# Patient Record
Sex: MALE | Race: WHITE | Employment: OTHER | URBAN - METROPOLITAN AREA
[De-identification: names, ages, dates, MRNs, and addresses within clinical notes are randomized per-mention and may not be internally consistent; named-entity substitution may affect disease eponyms.]

---

## 2017-07-15 ENCOUNTER — HOSPITAL ENCOUNTER (INPATIENT)
Facility: HOSPITAL | Age: 57
LOS: 3 days | Discharge: NURSING, REHAB OR LONG-TERM CARE | DRG: 552 | End: 2017-07-19
Attending: SURGERY | Admitting: SURGERY
Payer: COMMERCIAL

## 2017-07-15 DIAGNOSIS — R26.2 AMBULATORY DYSFUNCTION: ICD-10-CM

## 2017-07-15 DIAGNOSIS — S12.600A CLOSED C7 FRACTURE (HCC): Primary | ICD-10-CM

## 2017-07-15 PROCEDURE — 71010 HB CHEST X-RAY 1 VIEW FRONTAL: CPT

## 2017-07-15 PROCEDURE — 96375 TX/PRO/DX INJ NEW DRUG ADDON: CPT

## 2017-07-15 PROCEDURE — 96374 THER/PROPH/DIAG INJ IV PUSH: CPT

## 2017-07-15 RX ORDER — FENTANYL CITRATE 50 UG/ML
INJECTION, SOLUTION INTRAMUSCULAR; INTRAVENOUS CODE/TRAUMA/SEDATION MEDICATION
Status: COMPLETED | OUTPATIENT
Start: 2017-07-15 | End: 2017-07-15

## 2017-07-15 RX ORDER — LORAZEPAM 2 MG/ML
INJECTION INTRAMUSCULAR CODE/TRAUMA/SEDATION MEDICATION
Status: COMPLETED | OUTPATIENT
Start: 2017-07-15 | End: 2017-07-15

## 2017-07-15 RX ADMIN — FENTANYL CITRATE 100 MCG: 50 INJECTION, SOLUTION INTRAMUSCULAR; INTRAVENOUS at 23:53

## 2017-07-15 RX ADMIN — LORAZEPAM 1 MG: 2 INJECTION INTRAMUSCULAR; INTRAVENOUS at 23:59

## 2017-07-16 ENCOUNTER — APPOINTMENT (EMERGENCY)
Dept: RADIOLOGY | Facility: HOSPITAL | Age: 57
DRG: 552 | End: 2017-07-16
Payer: COMMERCIAL

## 2017-07-16 LAB
ABO GROUP BLD: NORMAL
ANION GAP SERPL CALCULATED.3IONS-SCNC: 10 MMOL/L (ref 4–13)
BLD GP AB SCN SERPL QL: NEGATIVE
BUN SERPL-MCNC: 8 MG/DL (ref 5–25)
CALCIUM SERPL-MCNC: 8 MG/DL (ref 8.3–10.1)
CHLORIDE SERPL-SCNC: 98 MMOL/L (ref 100–108)
CO2 SERPL-SCNC: 22 MMOL/L (ref 21–32)
CREAT SERPL-MCNC: 0.54 MG/DL (ref 0.6–1.3)
GFR SERPL CREATININE-BSD FRML MDRD: >60 ML/MIN/1.73SQ M
GLUCOSE SERPL-MCNC: 96 MG/DL (ref 65–140)
HCT VFR BLD AUTO: 35.4 % (ref 36.5–49.3)
HCT VFR BLD AUTO: 37.4 % (ref 36.5–49.3)
HGB BLD-MCNC: 12.9 G/DL (ref 12–17)
HGB BLD-MCNC: 13.6 G/DL (ref 12–17)
POTASSIUM SERPL-SCNC: 3.6 MMOL/L (ref 3.5–5.3)
RH BLD: NEGATIVE
SODIUM SERPL-SCNC: 130 MMOL/L (ref 136–145)
SPECIMEN EXPIRATION DATE: NORMAL

## 2017-07-16 PROCEDURE — 86900 BLOOD TYPING SEROLOGIC ABO: CPT | Performed by: EMERGENCY MEDICINE

## 2017-07-16 PROCEDURE — 85014 HEMATOCRIT: CPT | Performed by: EMERGENCY MEDICINE

## 2017-07-16 PROCEDURE — 72125 CT NECK SPINE W/O DYE: CPT

## 2017-07-16 PROCEDURE — 86901 BLOOD TYPING SEROLOGIC RH(D): CPT | Performed by: EMERGENCY MEDICINE

## 2017-07-16 PROCEDURE — 70450 CT HEAD/BRAIN W/O DYE: CPT

## 2017-07-16 PROCEDURE — 86850 RBC ANTIBODY SCREEN: CPT | Performed by: EMERGENCY MEDICINE

## 2017-07-16 PROCEDURE — 85018 HEMOGLOBIN: CPT | Performed by: EMERGENCY MEDICINE

## 2017-07-16 PROCEDURE — 2W32XYZ IMMOBILIZATION OF NECK USING OTHER DEVICE: ICD-10-PCS | Performed by: EMERGENCY MEDICINE

## 2017-07-16 PROCEDURE — 74177 CT ABD & PELVIS W/CONTRAST: CPT

## 2017-07-16 PROCEDURE — 36415 COLL VENOUS BLD VENIPUNCTURE: CPT | Performed by: EMERGENCY MEDICINE

## 2017-07-16 PROCEDURE — 80048 BASIC METABOLIC PNL TOTAL CA: CPT | Performed by: EMERGENCY MEDICINE

## 2017-07-16 PROCEDURE — 71260 CT THORAX DX C+: CPT

## 2017-07-16 PROCEDURE — 99285 EMERGENCY DEPT VISIT HI MDM: CPT

## 2017-07-16 RX ORDER — METHOCARBAMOL 500 MG/1
500 TABLET, FILM COATED ORAL 4 TIMES DAILY PRN
COMMUNITY
End: 2019-12-12 | Stop reason: SDUPTHER

## 2017-07-16 RX ORDER — ACETAMINOPHEN 325 MG/1
975 TABLET ORAL EVERY 8 HOURS SCHEDULED
Status: DISCONTINUED | OUTPATIENT
Start: 2017-07-16 | End: 2017-07-19 | Stop reason: HOSPADM

## 2017-07-16 RX ORDER — METHOCARBAMOL 500 MG/1
500 TABLET, FILM COATED ORAL EVERY 8 HOURS SCHEDULED
Status: DISCONTINUED | OUTPATIENT
Start: 2017-07-16 | End: 2017-07-19 | Stop reason: HOSPADM

## 2017-07-16 RX ORDER — MORPHINE SULFATE 4 MG/ML
4 INJECTION, SOLUTION INTRAMUSCULAR; INTRAVENOUS ONCE
Status: COMPLETED | OUTPATIENT
Start: 2017-07-16 | End: 2017-07-16

## 2017-07-16 RX ORDER — PANTOPRAZOLE SODIUM 20 MG/1
20 TABLET, DELAYED RELEASE ORAL
Status: DISCONTINUED | OUTPATIENT
Start: 2017-07-17 | End: 2017-07-19 | Stop reason: HOSPADM

## 2017-07-16 RX ORDER — LOSARTAN POTASSIUM 50 MG/1
100 TABLET ORAL DAILY
Status: DISCONTINUED | OUTPATIENT
Start: 2017-07-16 | End: 2017-07-19 | Stop reason: HOSPADM

## 2017-07-16 RX ORDER — METOPROLOL TARTRATE 50 MG/1
50 TABLET, FILM COATED ORAL EVERY 12 HOURS SCHEDULED
COMMUNITY
End: 2020-09-14

## 2017-07-16 RX ORDER — LOSARTAN POTASSIUM 100 MG/1
100 TABLET ORAL DAILY
COMMUNITY
End: 2020-03-09

## 2017-07-16 RX ORDER — LOSARTAN POTASSIUM 25 MG/1
100 TABLET ORAL DAILY
COMMUNITY
End: 2017-07-16

## 2017-07-16 RX ORDER — METOPROLOL TARTRATE 50 MG/1
50 TABLET, FILM COATED ORAL EVERY 12 HOURS SCHEDULED
Status: DISCONTINUED | OUTPATIENT
Start: 2017-07-16 | End: 2017-07-19 | Stop reason: HOSPADM

## 2017-07-16 RX ORDER — OXYCODONE HYDROCHLORIDE 10 MG/1
10 TABLET ORAL EVERY 4 HOURS PRN
Status: DISCONTINUED | OUTPATIENT
Start: 2017-07-16 | End: 2017-07-19 | Stop reason: HOSPADM

## 2017-07-16 RX ADMIN — METHOCARBAMOL 500 MG: 500 TABLET ORAL at 22:26

## 2017-07-16 RX ADMIN — METOPROLOL TARTRATE 50 MG: 50 TABLET ORAL at 22:26

## 2017-07-16 RX ADMIN — ACETAMINOPHEN 975 MG: 325 TABLET, FILM COATED ORAL at 13:10

## 2017-07-16 RX ADMIN — ACETAMINOPHEN 975 MG: 325 TABLET, FILM COATED ORAL at 22:25

## 2017-07-16 RX ADMIN — HYDROMORPHONE HYDROCHLORIDE 1 MG: 1 INJECTION, SOLUTION INTRAMUSCULAR; INTRAVENOUS; SUBCUTANEOUS at 17:07

## 2017-07-16 RX ADMIN — MORPHINE SULFATE 4 MG: 4 INJECTION, SOLUTION INTRAMUSCULAR; INTRAVENOUS at 01:26

## 2017-07-16 RX ADMIN — ACETAMINOPHEN 975 MG: 325 TABLET, FILM COATED ORAL at 06:32

## 2017-07-16 RX ADMIN — OXYCODONE HYDROCHLORIDE 10 MG: 10 TABLET ORAL at 11:39

## 2017-07-16 RX ADMIN — IOHEXOL 100 ML: 350 INJECTION, SOLUTION INTRAVENOUS at 00:09

## 2017-07-16 RX ADMIN — METHOCARBAMOL 500 MG: 500 TABLET ORAL at 06:32

## 2017-07-16 RX ADMIN — METOPROLOL TARTRATE 50 MG: 50 TABLET ORAL at 14:14

## 2017-07-16 RX ADMIN — METHOCARBAMOL 500 MG: 500 TABLET ORAL at 13:10

## 2017-07-16 RX ADMIN — HYDROMORPHONE HYDROCHLORIDE 1 MG: 1 INJECTION, SOLUTION INTRAMUSCULAR; INTRAVENOUS; SUBCUTANEOUS at 06:42

## 2017-07-17 ENCOUNTER — APPOINTMENT (INPATIENT)
Dept: RADIOLOGY | Facility: HOSPITAL | Age: 57
DRG: 552 | End: 2017-07-17
Payer: COMMERCIAL
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
## 2017-07-17 PROBLEM — S32.049A L4 VERTEBRAL FRACTURE (HCC): Status: ACTIVE | Noted: 2017-07-17

## 2017-07-17 PROBLEM — S12.600A C7 CERVICAL FRACTURE (HCC): Status: ACTIVE | Noted: 2017-07-17

## 2017-07-17 PROBLEM — S30.1XXA HEMATOMA OF LEFT FLANK: Status: ACTIVE | Noted: 2017-07-17

## 2017-07-17 PROCEDURE — G8978 MOBILITY CURRENT STATUS: HCPCS

## 2017-07-17 PROCEDURE — 72040 X-RAY EXAM NECK SPINE 2-3 VW: CPT

## 2017-07-17 PROCEDURE — 97163 PT EVAL HIGH COMPLEX 45 MIN: CPT

## 2017-07-17 PROCEDURE — 97167 OT EVAL HIGH COMPLEX 60 MIN: CPT

## 2017-07-17 PROCEDURE — G8987 SELF CARE CURRENT STATUS: HCPCS

## 2017-07-17 PROCEDURE — G8979 MOBILITY GOAL STATUS: HCPCS

## 2017-07-17 PROCEDURE — G8988 SELF CARE GOAL STATUS: HCPCS

## 2017-07-17 RX ORDER — DOCUSATE SODIUM 100 MG/1
100 CAPSULE, LIQUID FILLED ORAL 2 TIMES DAILY
Status: DISCONTINUED | OUTPATIENT
Start: 2017-07-17 | End: 2017-07-19 | Stop reason: HOSPADM

## 2017-07-17 RX ORDER — POLYETHYLENE GLYCOL 3350 17 G/17G
17 POWDER, FOR SOLUTION ORAL DAILY PRN
Status: DISCONTINUED | OUTPATIENT
Start: 2017-07-17 | End: 2017-07-19 | Stop reason: HOSPADM

## 2017-07-17 RX ORDER — OXYCODONE HYDROCHLORIDE 5 MG/1
5 TABLET ORAL EVERY 4 HOURS PRN
Status: DISCONTINUED | OUTPATIENT
Start: 2017-07-17 | End: 2017-07-19 | Stop reason: HOSPADM

## 2017-07-17 RX ORDER — SENNOSIDES 8.6 MG
1 TABLET ORAL
Status: DISCONTINUED | OUTPATIENT
Start: 2017-07-17 | End: 2017-07-19 | Stop reason: HOSPADM

## 2017-07-17 RX ORDER — GABAPENTIN 100 MG/1
100 CAPSULE ORAL 3 TIMES DAILY
Status: DISCONTINUED | OUTPATIENT
Start: 2017-07-17 | End: 2017-07-19 | Stop reason: HOSPADM

## 2017-07-17 RX ADMIN — OXYCODONE HYDROCHLORIDE 10 MG: 10 TABLET ORAL at 09:51

## 2017-07-17 RX ADMIN — SENNOSIDES 8.6 MG: 8.6 TABLET, FILM COATED ORAL at 21:15

## 2017-07-17 RX ADMIN — LOSARTAN POTASSIUM 100 MG: 50 TABLET, FILM COATED ORAL at 08:37

## 2017-07-17 RX ADMIN — METHOCARBAMOL 500 MG: 500 TABLET ORAL at 05:28

## 2017-07-17 RX ADMIN — METHOCARBAMOL 500 MG: 500 TABLET ORAL at 21:15

## 2017-07-17 RX ADMIN — GABAPENTIN 100 MG: 100 CAPSULE ORAL at 21:15

## 2017-07-17 RX ADMIN — METHOCARBAMOL 500 MG: 500 TABLET ORAL at 14:18

## 2017-07-17 RX ADMIN — HYDROMORPHONE HYDROCHLORIDE 1 MG: 1 INJECTION, SOLUTION INTRAMUSCULAR; INTRAVENOUS; SUBCUTANEOUS at 12:37

## 2017-07-17 RX ADMIN — PANTOPRAZOLE SODIUM 20 MG: 20 TABLET, DELAYED RELEASE ORAL at 05:28

## 2017-07-17 RX ADMIN — ACETAMINOPHEN 975 MG: 325 TABLET, FILM COATED ORAL at 05:28

## 2017-07-17 RX ADMIN — METOPROLOL TARTRATE 50 MG: 50 TABLET ORAL at 08:37

## 2017-07-17 RX ADMIN — ACETAMINOPHEN 975 MG: 325 TABLET, FILM COATED ORAL at 14:17

## 2017-07-17 RX ADMIN — METOPROLOL TARTRATE 50 MG: 50 TABLET ORAL at 21:15

## 2017-07-17 RX ADMIN — OXYCODONE HYDROCHLORIDE 10 MG: 10 TABLET ORAL at 19:15

## 2017-07-17 RX ADMIN — DOCUSATE SODIUM 100 MG: 100 CAPSULE, LIQUID FILLED ORAL at 17:30

## 2017-07-17 RX ADMIN — ACETAMINOPHEN 975 MG: 325 TABLET, FILM COATED ORAL at 21:15

## 2017-07-18 PROBLEM — R29.6 RECURRENT FALLS: Status: ACTIVE | Noted: 2017-07-18

## 2017-07-18 PROBLEM — R26.2 AMBULATORY DYSFUNCTION: Status: ACTIVE | Noted: 2017-07-18

## 2017-07-18 PROBLEM — R53.81 PHYSICAL DECONDITIONING: Status: ACTIVE | Noted: 2017-07-18

## 2017-07-18 PROBLEM — E87.1 HYPONATREMIA: Status: ACTIVE | Noted: 2017-07-18

## 2017-07-18 PROBLEM — F32.A DEPRESSION: Status: ACTIVE | Noted: 2017-07-18

## 2017-07-18 LAB
ANION GAP SERPL CALCULATED.3IONS-SCNC: 4 MMOL/L (ref 4–13)
BASOPHILS # BLD AUTO: 0.03 THOUSANDS/ΜL (ref 0–0.1)
BASOPHILS NFR BLD AUTO: 1 % (ref 0–1)
BUN SERPL-MCNC: 9 MG/DL (ref 5–25)
CALCIUM SERPL-MCNC: 8.6 MG/DL (ref 8.3–10.1)
CHLORIDE SERPL-SCNC: 98 MMOL/L (ref 100–108)
CO2 SERPL-SCNC: 30 MMOL/L (ref 21–32)
CREAT SERPL-MCNC: 0.68 MG/DL (ref 0.6–1.3)
EOSINOPHIL # BLD AUTO: 0.23 THOUSAND/ΜL (ref 0–0.61)
EOSINOPHIL NFR BLD AUTO: 5 % (ref 0–6)
ERYTHROCYTE [DISTWIDTH] IN BLOOD BY AUTOMATED COUNT: 12.5 % (ref 11.6–15.1)
GFR SERPL CREATININE-BSD FRML MDRD: >60 ML/MIN/1.73SQ M
GLUCOSE SERPL-MCNC: 82 MG/DL (ref 65–140)
HCT VFR BLD AUTO: 36.8 % (ref 36.5–49.3)
HGB BLD-MCNC: 12.9 G/DL (ref 12–17)
LYMPHOCYTES # BLD AUTO: 1.06 THOUSANDS/ΜL (ref 0.6–4.47)
LYMPHOCYTES NFR BLD AUTO: 21 % (ref 14–44)
MCH RBC QN AUTO: 32.7 PG (ref 26.8–34.3)
MCHC RBC AUTO-ENTMCNC: 35.1 G/DL (ref 31.4–37.4)
MCV RBC AUTO: 93 FL (ref 82–98)
MONOCYTES # BLD AUTO: 0.71 THOUSAND/ΜL (ref 0.17–1.22)
MONOCYTES NFR BLD AUTO: 14 % (ref 4–12)
NEUTROPHILS # BLD AUTO: 2.89 THOUSANDS/ΜL (ref 1.85–7.62)
NEUTS SEG NFR BLD AUTO: 59 % (ref 43–75)
NRBC BLD AUTO-RTO: 0 /100 WBCS
PLATELET # BLD AUTO: 153 THOUSANDS/UL (ref 149–390)
PMV BLD AUTO: 8.6 FL (ref 8.9–12.7)
POTASSIUM SERPL-SCNC: 4 MMOL/L (ref 3.5–5.3)
RBC # BLD AUTO: 3.95 MILLION/UL (ref 3.88–5.62)
SODIUM SERPL-SCNC: 132 MMOL/L (ref 136–145)
WBC # BLD AUTO: 4.97 THOUSAND/UL (ref 4.31–10.16)

## 2017-07-18 PROCEDURE — 85025 COMPLETE CBC W/AUTO DIFF WBC: CPT | Performed by: NURSE PRACTITIONER

## 2017-07-18 PROCEDURE — 97535 SELF CARE MNGMENT TRAINING: CPT

## 2017-07-18 PROCEDURE — 97116 GAIT TRAINING THERAPY: CPT

## 2017-07-18 PROCEDURE — 80048 BASIC METABOLIC PNL TOTAL CA: CPT | Performed by: NURSE PRACTITIONER

## 2017-07-18 RX ORDER — LIDOCAINE 50 MG/G
1 PATCH TOPICAL DAILY
Status: DISCONTINUED | OUTPATIENT
Start: 2017-07-18 | End: 2017-07-19 | Stop reason: HOSPADM

## 2017-07-18 RX ORDER — SERTRALINE HYDROCHLORIDE 25 MG/1
25 TABLET, FILM COATED ORAL
Status: DISCONTINUED | OUTPATIENT
Start: 2017-07-18 | End: 2017-07-19 | Stop reason: HOSPADM

## 2017-07-18 RX ADMIN — PANTOPRAZOLE SODIUM 20 MG: 20 TABLET, DELAYED RELEASE ORAL at 06:12

## 2017-07-18 RX ADMIN — LIDOCAINE 1 PATCH: 50 PATCH CUTANEOUS at 08:27

## 2017-07-18 RX ADMIN — GABAPENTIN 100 MG: 100 CAPSULE ORAL at 20:44

## 2017-07-18 RX ADMIN — METHOCARBAMOL 500 MG: 500 TABLET ORAL at 06:12

## 2017-07-18 RX ADMIN — METOPROLOL TARTRATE 50 MG: 50 TABLET ORAL at 08:27

## 2017-07-18 RX ADMIN — SERTRALINE HYDROCHLORIDE 25 MG: 25 TABLET ORAL at 21:41

## 2017-07-18 RX ADMIN — METHOCARBAMOL 500 MG: 500 TABLET ORAL at 21:41

## 2017-07-18 RX ADMIN — METOPROLOL TARTRATE 50 MG: 50 TABLET ORAL at 20:44

## 2017-07-18 RX ADMIN — DOCUSATE SODIUM 100 MG: 100 CAPSULE, LIQUID FILLED ORAL at 08:27

## 2017-07-18 RX ADMIN — ACETAMINOPHEN 975 MG: 325 TABLET, FILM COATED ORAL at 14:08

## 2017-07-18 RX ADMIN — OXYCODONE HYDROCHLORIDE 10 MG: 10 TABLET ORAL at 08:28

## 2017-07-18 RX ADMIN — LOSARTAN POTASSIUM 100 MG: 50 TABLET, FILM COATED ORAL at 08:27

## 2017-07-18 RX ADMIN — ACETAMINOPHEN 975 MG: 325 TABLET, FILM COATED ORAL at 21:41

## 2017-07-18 RX ADMIN — SENNOSIDES 8.6 MG: 8.6 TABLET, FILM COATED ORAL at 21:41

## 2017-07-18 RX ADMIN — DOCUSATE SODIUM 100 MG: 100 CAPSULE, LIQUID FILLED ORAL at 17:31

## 2017-07-18 RX ADMIN — METHOCARBAMOL 500 MG: 500 TABLET ORAL at 14:08

## 2017-07-18 RX ADMIN — GABAPENTIN 100 MG: 100 CAPSULE ORAL at 17:31

## 2017-07-18 RX ADMIN — ENOXAPARIN SODIUM 40 MG: 40 INJECTION SUBCUTANEOUS at 09:56

## 2017-07-18 RX ADMIN — OXYCODONE HYDROCHLORIDE 10 MG: 10 TABLET ORAL at 00:16

## 2017-07-18 RX ADMIN — ACETAMINOPHEN 975 MG: 325 TABLET, FILM COATED ORAL at 06:12

## 2017-07-18 RX ADMIN — GABAPENTIN 100 MG: 100 CAPSULE ORAL at 08:27

## 2017-07-19 VITALS
SYSTOLIC BLOOD PRESSURE: 149 MMHG | HEART RATE: 73 BPM | HEIGHT: 72 IN | BODY MASS INDEX: 31.15 KG/M2 | RESPIRATION RATE: 16 BRPM | OXYGEN SATURATION: 97 % | WEIGHT: 230 LBS | DIASTOLIC BLOOD PRESSURE: 83 MMHG | TEMPERATURE: 98.2 F

## 2017-07-19 PROCEDURE — 97110 THERAPEUTIC EXERCISES: CPT

## 2017-07-19 PROCEDURE — 97116 GAIT TRAINING THERAPY: CPT

## 2017-07-19 RX ORDER — SERTRALINE HYDROCHLORIDE 25 MG/1
25 TABLET, FILM COATED ORAL
Qty: 30 TABLET | Refills: 0 | Status: SHIPPED | OUTPATIENT
Start: 2017-07-19

## 2017-07-19 RX ORDER — GABAPENTIN 100 MG/1
100 CAPSULE ORAL 3 TIMES DAILY
Qty: 30 CAPSULE | Refills: 0 | Status: SHIPPED | OUTPATIENT
Start: 2017-07-19

## 2017-07-19 RX ORDER — ACETAMINOPHEN 325 MG/1
650 TABLET ORAL EVERY 6 HOURS PRN
Qty: 30 TABLET | Refills: 0 | Status: SHIPPED | OUTPATIENT
Start: 2017-07-19

## 2017-07-19 RX ORDER — DOCUSATE SODIUM 100 MG/1
100 CAPSULE, LIQUID FILLED ORAL 2 TIMES DAILY
Qty: 10 CAPSULE | Refills: 0 | Status: SHIPPED | OUTPATIENT
Start: 2017-07-19

## 2017-07-19 RX ORDER — OXYCODONE HYDROCHLORIDE 5 MG/1
5 TABLET ORAL EVERY 4 HOURS PRN
Qty: 30 TABLET | Refills: 0 | Status: SHIPPED | OUTPATIENT
Start: 2017-07-19 | End: 2017-07-29

## 2017-07-19 RX ORDER — POLYETHYLENE GLYCOL 3350 17 G/17G
17 POWDER, FOR SOLUTION ORAL DAILY PRN
Qty: 14 EACH | Refills: 0 | Status: SHIPPED | OUTPATIENT
Start: 2017-07-19

## 2017-07-19 RX ADMIN — ENOXAPARIN SODIUM 40 MG: 40 INJECTION SUBCUTANEOUS at 09:33

## 2017-07-19 RX ADMIN — METHOCARBAMOL 500 MG: 500 TABLET ORAL at 13:59

## 2017-07-19 RX ADMIN — OXYCODONE HYDROCHLORIDE 10 MG: 10 TABLET ORAL at 09:37

## 2017-07-19 RX ADMIN — ACETAMINOPHEN 975 MG: 325 TABLET, FILM COATED ORAL at 07:37

## 2017-07-19 RX ADMIN — PANTOPRAZOLE SODIUM 20 MG: 20 TABLET, DELAYED RELEASE ORAL at 07:36

## 2017-07-19 RX ADMIN — LIDOCAINE 1 PATCH: 50 PATCH CUTANEOUS at 09:33

## 2017-07-19 RX ADMIN — LOSARTAN POTASSIUM 100 MG: 50 TABLET, FILM COATED ORAL at 09:37

## 2017-07-19 RX ADMIN — GABAPENTIN 100 MG: 100 CAPSULE ORAL at 09:37

## 2017-07-19 RX ADMIN — METOPROLOL TARTRATE 50 MG: 50 TABLET ORAL at 09:38

## 2017-07-19 RX ADMIN — METHOCARBAMOL 500 MG: 500 TABLET ORAL at 07:36

## 2017-07-19 RX ADMIN — ACETAMINOPHEN 975 MG: 325 TABLET, FILM COATED ORAL at 13:59

## 2017-07-25 ENCOUNTER — GENERIC CONVERSION - ENCOUNTER (OUTPATIENT)
Dept: OTHER | Facility: OTHER | Age: 57
End: 2017-07-25

## 2017-07-28 ENCOUNTER — TRANSCRIBE ORDERS (OUTPATIENT)
Dept: RADIOLOGY | Facility: HOSPITAL | Age: 57
End: 2017-07-28

## 2017-07-28 ENCOUNTER — HOSPITAL ENCOUNTER (OUTPATIENT)
Dept: RADIOLOGY | Facility: HOSPITAL | Age: 57
Discharge: HOME | End: 2017-07-28
Attending: NEUROLOGICAL SURGERY
Payer: COMMERCIAL

## 2017-07-28 DIAGNOSIS — S12.600A CLOSED C7 FRACTURE (HCC): ICD-10-CM

## 2017-07-28 PROCEDURE — 72040 X-RAY EXAM NECK SPINE 2-3 VW: CPT

## 2017-08-01 ENCOUNTER — ALLSCRIPTS OFFICE VISIT (OUTPATIENT)
Dept: OTHER | Facility: OTHER | Age: 57
End: 2017-08-01

## 2017-08-24 ENCOUNTER — ALLSCRIPTS OFFICE VISIT (OUTPATIENT)
Dept: OTHER | Facility: OTHER | Age: 57
End: 2017-08-24

## 2017-08-26 DIAGNOSIS — S12.600A CLOSED DISPLACED FRACTURE OF SEVENTH CERVICAL VERTEBRA (HCC): ICD-10-CM

## 2017-08-29 ENCOUNTER — TRANSCRIBE ORDERS (OUTPATIENT)
Dept: ADMINISTRATIVE | Facility: HOSPITAL | Age: 57
End: 2017-08-29

## 2017-08-29 ENCOUNTER — APPOINTMENT (OUTPATIENT)
Dept: RADIOLOGY | Facility: MEDICAL CENTER | Age: 57
End: 2017-08-29
Payer: COMMERCIAL

## 2017-08-29 DIAGNOSIS — S12.600A CLOSED DISPLACED FRACTURE OF SEVENTH CERVICAL VERTEBRA (HCC): ICD-10-CM

## 2017-08-29 PROCEDURE — 72040 X-RAY EXAM NECK SPINE 2-3 VW: CPT

## 2017-08-31 ENCOUNTER — ALLSCRIPTS OFFICE VISIT (OUTPATIENT)
Dept: OTHER | Facility: OTHER | Age: 57
End: 2017-08-31

## 2017-09-26 DIAGNOSIS — S12.600A CLOSED DISPLACED FRACTURE OF SEVENTH CERVICAL VERTEBRA (HCC): ICD-10-CM

## 2017-10-02 ENCOUNTER — APPOINTMENT (OUTPATIENT)
Dept: RADIOLOGY | Facility: MEDICAL CENTER | Age: 57
End: 2017-10-02
Payer: COMMERCIAL

## 2017-10-02 ENCOUNTER — TRANSCRIBE ORDERS (OUTPATIENT)
Dept: ADMINISTRATIVE | Facility: HOSPITAL | Age: 57
End: 2017-10-02

## 2017-10-02 DIAGNOSIS — S12.600A CLOSED DISPLACED FRACTURE OF SEVENTH CERVICAL VERTEBRA (HCC): ICD-10-CM

## 2017-10-02 PROCEDURE — 72040 X-RAY EXAM NECK SPINE 2-3 VW: CPT

## 2017-10-05 ENCOUNTER — ALLSCRIPTS OFFICE VISIT (OUTPATIENT)
Dept: OTHER | Facility: OTHER | Age: 57
End: 2017-10-05

## 2017-10-10 NOTE — PROGRESS NOTES
Assessment  1  C7 cervical fracture (805 07) (S12 600A)  2  History of Neck Surgery    Plan   · * XR SPINE CERVICAL 2 OR 3 VW INJURY; Status:Active; Requested QWU:[id number];   Perform:[facility]iology; Order Comments:Upright Lateral neutral and lateral flexion   / extension views  Patient to take C-collar off for x-ray and   patient to put c-collar back on after xray ; Due:05Oct2018; Ordered; For:C7 cervical   fracture; Ordered By:Balbir Floyd;    Patient is to call our office after he has c-spine x-ray completed  Further neurosurgical recommendation is pending result of cervical flex/ext xray  Discussion/Summary    This is a 59year old male who presents for ongoing hospital consult (7/16/17) follow up for C7 spinous process fracture which was diagnosed s/p fall down steps prior to hospitalization  He has been managed in C-collar for approximately 11 weeks  He has a previous C3-C7 posterior cervical decompression with instrumented fixation fusion in Sept 2014 (Dr Evangelista Boothe) for cervical spondylotic myelopathy  He was also found to have a left L4 transverse process fracture which was stable and did not require intervention  His left flank hematoma management is as per Trauma service  case and C-spine Xray (10/2/17) was reviewed with Dr Evangelista Boothe and compared to previous cervical imaging  The distraction of the spinous process fracture at C7 appears stable to prior X-ray on 8/29/17  Spinal alignment is stable and satisfactory  this juncture recommend Mr Mu Dougherty go for a cervical flexion/extension x-ray  Patient reports he is unable to have today due to son's work obligation but patient reports he will go for c-spine x-ray over next few days  He is to take the c-collar off for cervical flex/ext x-ray and put c-collar back on after x-ray  Otherwise he is to wear c-collar at all times until notified otherwise be our office   Mr Mu Dougherty is advised to call our office after he has cervical x-ray completed so result can be reviewed  c-spine flex/ext x-ray is satisfactory then plan to weaning out of c-collar, could consider PT, and then follow up in 1 month with new C-spine flex/ext xray  if there is instability on cervical flex/ext xray then he will need to continue c-collar and return to discuss surgical options with Dr Maryan Becerril  information was discussed in detail with Mr Lavon Chaves and his son  is to refrain from strenuous activity and limit lifting / pulling / pushing to 10 lbs  No driving in c-collar  Recommend patient take fall precautions  is advised to contact our office or present to ER with neurological change  and his son expressed understanding and agreement  The patient, patient's family was counseled regarding diagnostic results,-instructions for management,-impressions  The patient has the current Goals:   Patient is able to Self-Care  The treatment plan was reviewed with the patient/guardian  The patient/guardian understands and agrees with the treatment plan      Chief Complaint  58 y/o male presents here for a 4 wk f/u with new csp x-ray to review      History of Present Illness  This is a 59year old male who presents for ongoing hospital consult (7/16/17) follow up for C7 spinous process fracture which was diagnosed s/p fall down steps prior to hospitalization  He was recommended treatment with C-collar  Of note he has a previous C3-C7 posterior cervical decompression with instrumented fixation fusion in Sept 2014 (Dr Maryan Becerril) for cervical spondylotic myelopathy  He was also found to have a left L4 transverse process fracture which was stable and did not require intervention  Additionally he was found to have a left flank hematoma which was being treated by Trauma with an abdominal binder  He had a follow up appointment with Trauma service on 8/24/17 and abdominal binder was discontinued     was last seen in the office on 8/31/17 and at that juncture maintained in c-collar and advised follow up in 4 weeks with new C-spine x-ray  Hope Aguila presents for follow up accompanied with his son  Patient reports he continues to wear c-collar at all times  He reports some soreness of posterior head / neck / shoulders / trapezius  He takes Methocarbamol 500mg 1 tab BID  He is off oxycodone  He denies radicular arm pain  He denies numbness or tingling in his upper extremities, torso, on lower extremities  He reports waxing/weaning generalized weakness of his upper extremities and lower extremities  He ambulates with walker  He denies saddle paresthesias  He denies bladder or bowel dysfunction  Review of Systems    Constitutional: No fever or chills, feels well, no tiredness, no recent weight gain or weight loss  Eyes: No complaints of eye pain, no red eyes, no discharge from eyes, no itchy eyes  ENT: no complaints of earache, no hearing loss, no nosebleeds, no nasal discharge, no sore throat, no hoarseness  Cardiovascular: No complaints of slow heart rate, no fast heart rate, no chest pain, no palpitations, no leg claudication, no lower extremity  Respiratory: No complaints of shortness of breath, no wheezing, no cough, no SOB on exertion, no orthopnea or PND  Gastrointestinal: No complaints of abdominal pain, no constipation, no nausea or vomiting, no diarrhea or bloody stools  Genitourinary: No complaints of dysuria, no incontinence, no hesitancy, no nocturia, no genital lesion, no testicular pain  Musculoskeletal: soreness in trapezius/shoulders  Integumentary: lower back swelling where hematoma was, but-No complaints of skin rash or skin lesions, no itching, no skin wound, no dry skin  Neurological: limb weakness-and-difficulty walking, but-no numbness,-no tingling,-no confusion,-no dizziness,-no convulsions-and-no fainting-   The patient presents with complaints of bilateral occipital headache     Psychiatric: Is not suicidal, no sleep disturbances, no anxiety or depression, no change in personality, no emotional problems  Endocrine: No complaints of proptosis, no hot flashes, no muscle weakness, no erectile dysfunction, no deepening of the voice, no feelings of weakness  Hematologic/Lymphatic: No complaints of swollen glands, no swollen glands in the neck, does not bleed easily, no easy bruising  ROS reviewed  Active Problems  1  Ataxia (781 3) (R27 0)  2  C7 cervical fracture (805 07) (S12 600A)  3  Cerebral cysts (348 0) (G93 0)  4  Cervical spinal stenosis (723 0) (M48 02)  5  Closed C7 fracture (805 07) (S12 600A)  6  Constipation (564 00) (K59 00)  7  Cyst of kidney, acquired (593 2) (N28 1)  8  Depression (311) (F32 9)  9  Difficulty concentrating (799 51) (R41 840)  10  Disorientation (780 99) (R41 0)  11  Drop attack (780 2) (R55)  12  Excessive sweating (780 8) (R61)  13  Fall down stairs (E880 9) (W10 8XXA)  14  Fatigue (780 79) (R53 83)  15  Follow-up examination following surgery (V67 00) (Z09)  16  Gait abnormality (781 2) (R26 9)  17  Gait disturbance (781 2) (R26 9)  18  Hearing loss (389 9) (H91 90)  19  Heartburn (787 1) (R12)  20  Hematoma of left flank, initial encounter (922 2) (S30 1XXA)  21  History of fall (V15 88) (Z91 81)  22  Hypertension (401 9) (I10)  23  Hyposmolality and/or hyponatremia (276 1) (E87 1)  24  L4 vertebral fracture (805 4) (S32 049A)  25  Left leg weakness (729 89) (R29 898)  26  Lumbar transverse process fracture (805 4) (S32 009A)  27  Memory loss (780 93) (R41 3)  28  Muscle weakness (728 87) (M62 81)  29  Myalgia (729 1) (M79 1)  30  Other spondylosis with myelopathy, cervical region (721 1) (M47 12)  31  Painful urination (788 1) (R30 9)  32  Peripheral neuropathy (356 9) (G62 9)  33  Poor balance (781 99) (R26 89)  34  Spondylosis, unspecified (721 90) (M47 9)  35  Surgical counseling visit (V65 49) (Z71 89)  36  Vertigo (780 4) (R42)  37  Visual disturbances (368 9) (H53 9)  38  Vitamin B12 deficiency (266 2) (E53 8)  39   Weakness of left arm (729 89) (R29 898)    Past Medical History  1  History of Ambulatory dysfunction (719 7) (R26 2)  2  History of Fall, initial encounter (E888 9) [facility])  3  History of seasonal allergies (V15 09) (Z88 9)  4  Personal history of cardiac murmur (V12 59) (Z86 79)  5  History of Physical deconditioning (799 3) (R53 81)  6  History of Recurrent falls (V15 88) (R29 6)  7  History of Thoracic outlet syndrome (353 0) (G54 0)  8  History of Wears eyeglasses (V49 89) (Z97 3)    The active problems and past medical history were reviewed and updated today  Surgical History  1  History of Facial Surgery  2  History of Hand Repair  3  History of Hernia Repair  4  History of Neck Surgery  5  History of Thoracotomy    The surgical history was reviewed and updated today  Family History  Mother   1  Family history of   2  Family history of cerebrovascular accident (V17 1) (Z82 3)  3  Family history of cerebrovascular accident (CVA) (V17 1) (Z82 3)  4  Family history of heart failure (V17 49) (Z82 49)  5  Family history of myocardial infarction (V17 3) (Z82 49)  Father   10  Family history of   7  Family history of Heart problem  Sibling   8  Family history of   5  Family history of myocardial infarction (V17 3) (Z82 49)  Maternal Grandmother   10  Family history of   Paternal Grandmother   6  Family history of   Maternal Grandfather   15  Family history of   Paternal Grandfather   15  Family history of     The family history was reviewed and updated today         Social History   · Alcohol Use (History)   · Caffeine Use   · Currently sexually active   · History of Daily alcohol use   · Education Level: Less than high school   · Functioning activity level   · Has 2 children   · Hearing loss (389 9) (H91 90)   · High school or GED   · Lives independently with spouse   · Lives with wife   ·    · Never A Smoker   · No drug use   · Two children  The social history was reviewed and updated today  Current Meds  1  Losartan Potassium 100 MG Oral Tablet; TAKE 1 TABLET DAILY; Therapy: 01Aug2017 to (Evaluate:28Jan2018) Recorded  2  Methocarbamol 500 MG Oral Tablet; 1 TABLET EVERY 8 HOURS PRN; Therapy: (Recorded:01Aug2017) to Recorded  3  Metoprolol Tartrate 50 MG Oral Tablet; TAKE 1 TABLET TWICE DAILY; Therapy: 01Aug2017 to (Evaluate:28Jan2018) Recorded  4  Omeprazole 40 MG Oral Capsule Delayed Release; 1 CAPSULE DAILY; Therapy: 01Aug2017 to Recorded    The medication list was reviewed and updated today  Allergies  1  No Known Drug Allergies  2  No Known Environmental Allergies  3  No Known Food Allergies  Denied   4  Anesthesia Extension Tubing Miscellaneous    Vitals  Vital Signs    Recorded: [id number] 10:10AM   Temperature 99 F, Oral   Heart Rate 64   Respiration 18   Systolic 390, LUE, Sitting   Diastolic 94, LUE, Sitting   Height 5 ft 11 in   Weight 218 lb 6 oz   BMI Calculated 30 46   BSA Calculated 2 19   Pain Scale 0     Physical Exam     Constitutional Patient appears healthy and well developed  No signs of acute distress present  -Wearing [place] c-collar  Respiratory Respiratory effort: Normal    Musculo: Spine Contour is normal  No tenderness of the spine billaterally  Cervical Spine examination demonstrates no visible abnormailities,-normal lordosis-and-no spine tenderness on palpation (MIld limitation with cervical flexion / extension -- stretching sensation  No significant discomfort with cervical flexion and extension)  Neurologic - Mental Status: Alert and Oriented x3  -Speech is articulated and fluent  -Grossly nonfocal    Motor System - Upper Extremities: Shoulder abduction 5/5 bilaterally  Elbow flexion/extension 5/5 bilaterally  Wrist flex/ext 5/5 bilaterally  Finger  left 4/5 except unable to fully  left 4th finger in setting of ligament contracture base of finger (chronic)   He is unable to fully extend left 4th finger/5th in setting of contracture of ligament near base of 4th finger  Finger  right 5/5  Motor System - Lower Extremities: Hip flexion 5/5 bilaterally  Hip abduction / adduction 5/5 bilaterally  Knee flexion 5/5 bilaterally  Knee extension 5/5 bilaterally although limited ROM  Ankle DF/PF 5/5 bilaterally  Great toe DF 5/5 bilaterally  Reflexes: Biceps / brachioradialis / Triceps / patellar / achilles reflexes are hyporeflexic bilaterally  Toes downgoing bilaterally on Babinski  He withdraws feet as ticklish  No ankle clonus bilaterally  Cuellar sign was not present:   Coordination: Involuntary movements: None   Sensory: Pinprick intact upper extremities, torso, and lower extremities bilaterally  JPS intact b/l thumbs and great toes  Gait and Station: Ambulates steady w/ roller walker  Results/Data  Diagnostic Studies Reviewed Neurosurger [facility]:   I personally reviewed the cervical spine xray result in detail with the patient  * XR SPINE CERVICAL 2 OR 3 VW INJURY [id number] 12:14PM Jamilah Jamieson Order Number: [id number]   Performing Comments: Upright AP, Lateral, Swimmers views (f/u C7 fracture)   - Patient Instructions: Have completed 2-3 days prior to Neurosrugery follow up visit     Test Name Result Flag Reference   XR SPINE CERVICAL 2 OR 3 VW (Report)     CERVICAL SPINE     INDICATION: Follow-up C7 fracture status post surgery  COMPARISON: Cervical spine radiograph 8/29/2017     VIEWS: AP and lateral     IMAGES: 3     FINDINGS:     There are postsurgical changes status post posterior fusion of the C3-C7 levels  No evidence of acute fixation device complication  Stable appearance of the spinous process fracture at C7  There is disc height loss at C4-5 through C67  The prevertebral soft tissues are within normal limits  The lung apices are intact  Incidental note of small cervical ribs at C7         IMPRESSION:       1  Stable appearance of the fracture of the C7 spinous process  2  Stable alignment status post posterior fusion of C3-C7 without evidence of acute complication         Workstation performed: [id number]     Signed by:   Luis Pereira MD   10/5/17     Signatures   Electronically signed by : Chelsey Smith, [facility]; Oct  6 2017  5:38AM EST                       (Author)    Electronically signed by : VIKTORIYA Schultz ; Oct  9 2017  5:46PM EST                       (Author)

## 2017-10-12 ENCOUNTER — APPOINTMENT (OUTPATIENT)
Dept: RADIOLOGY | Facility: MEDICAL CENTER | Age: 57
End: 2017-10-12
Payer: COMMERCIAL

## 2017-10-12 DIAGNOSIS — S12.600A CLOSED DISPLACED FRACTURE OF SEVENTH CERVICAL VERTEBRA (HCC): ICD-10-CM

## 2017-10-12 PROCEDURE — 72040 X-RAY EXAM NECK SPINE 2-3 VW: CPT

## 2017-11-11 ENCOUNTER — TRANSCRIBE ORDERS (OUTPATIENT)
Dept: ADMINISTRATIVE | Facility: HOSPITAL | Age: 57
End: 2017-11-11

## 2017-11-11 ENCOUNTER — APPOINTMENT (OUTPATIENT)
Dept: RADIOLOGY | Facility: MEDICAL CENTER | Age: 57
End: 2017-11-11
Payer: COMMERCIAL

## 2017-11-11 DIAGNOSIS — S12.600A CLOSED DISPLACED FRACTURE OF SEVENTH CERVICAL VERTEBRA (HCC): ICD-10-CM

## 2017-11-11 PROCEDURE — 72040 X-RAY EXAM NECK SPINE 2-3 VW: CPT

## 2018-01-10 NOTE — PROGRESS NOTES
Assessment   1  C7 cervical fracture (805 07) (S12 600A)    Plan     · * XR SPINE CERVICAL 2 OR 3 VW INJURY; Status:Hold For - Exact Date; Requested  for:Approx 26Aug2017;   Perform:[facility] Radiology; Order Comments:Upright AP, Lateral, Swimmers   views;Ordered; For:C7 cervical fracture; Ordered By:[place]in[place] Floyd Caro; Follow-up Visit in 4 Weeks Evaluation and Treatment  Follow-up  sovl  with C-spine x-ray; when DR here  Status: Hold For - Scheduling  Requested for: [id number]  Ordered; For: C7 cervical fracture;  Ordered By: Jj Garvin  Performed:   Due: [id number]     Discussion/Summary    This is a 59year old male who presents for hospital consult (7/16/17) follow up for C7 spinous process fracture which was diagnosed s/p fall down steps prior to hospitalization  He was recommended treatment with C-collar  Of note he has a previous C3-C7 posterior cervical decompression with instrumented fixation fusion in Sept 2014 (Dr Evelio Ac) for cervical spondylotic myelopathy  He was also found to have a left L4 transverse process fracture which was stable and did not require intervention  His left flank hematoma is being managed by Trauma service with an abdominal binder  Patient's case and C-spine Xray (7/28/17) was reviewed with Dr Evelio Ac and compared to previous cervical imaging  Radiologist suggested there might be slight progressive distraction of the spinous process fracture at C7 (on 7/26/17 C-spine xray) but upon review with Dr Evelio Ac there is no significant change in appearance in comparison to previous C-spine xray (7/17/17) taking into consideration difference in angle at which xrays completed  The anterior vertebral line and posterior vertebral line is appropriate and appears stable in comparison of the 7/26/17 and 7/17/17 C-spine xrays  Continued conservative treatment is advised at this juncture    Patient is to wear Vista c-collar at times other then wearing [place] c-collar when showering  He is to follow up in 4 weeks with new C-spine xray  He is to refrain from strenuous activity and limit lifting / pulling / pushing to 10 lbs  No driving as he needs to remain in c-collar  He completed a course of inpatient rehabilitation recently and reports he is to begin home PT soon  Patient is advised to contact our office or present to ER with neurological change  Patient and his son expressed understanding and agreement  The patient, patient's family (son) was counseled regarding diagnostic results, instructions for management, impressions  The patient has the current Goals: Review c-spine result  The patent has the current Barriers: History of fall  Patient is able to Self-Care  The treatment plan was reviewed with the patient/guardian  The patient/guardian understands and agrees with the treatment plan      Chief Complaint  Patient presents for 2 week hospital f/u with cervical spine x ray  History of Present Illness  This is a 59year old male who presents for hospital consult (7/16/17) follow up for C7 spinous process fracture which was diagnosed s/p fall down steps prior to hospitalization  He was recommended treatment with C-collar  Of note he has a previous C3-C7 posterior cervical decompression with instrumented fixation fusion in Sept 2014 (Dr Lolita Kwong) for cervical spondylotic myelopathy  He was also found to have a left L4 transverse process fracture which was stable and did not require intervention  Additionally he was found to have a left flank expanding hematoma which was being treated with an abdominal binder  He has follow up appointment with Trauma service on 8/15/17  Patient present for follow up s/p C-spine x-ray  He is accompanied with his son  Patient reports wearing C-collar at all times  He has Vista c-collar on currently  He reports he has [place] c-collar for showering  Patient denies neck pain currently   However, he reports if he is doing a lot of therapy or walking a lot he can experience pain towards lower neck / tops of shoulders  He reports this is improved with maintaining good posture and since his walker height was elevated  He reports taking Methocarbamol TID prn, Oxycodone in AM and PM, and occasional Tylenol between Oxycodone has helped his pain  He denies numbness or tingling of his upper extremities or lower extremities  He reports some numbness to touch of skin in region of left flank hematoma  He reports he feels he has weakness of his arms / legs which he notes has been present since at least around time of his prior cervical procedure but subjectively thinks has worsened over time  He ambulates with walker  Patient reports he was in [facility] rehab until he was discharged home yesterday  Patient reports he received call from home services and notes he will be beginning home PT  Patient reports he can control his bladder and bowels  He reports he cut back stool softener to once a day as reported loose stools when on BID dosing  Review of Systems    Constitutional: No fever or chills, feels well, no tiredness, no recent weight gain or weight loss  Eyes: No complaints of eye pain, no red eyes, no discharge from eyes, no itchy eyes  ENT: no complaints of earache, no hearing loss, no nosebleeds, no nasal discharge, no sore throat, no hoarseness  Cardiovascular: No complaints of slow heart rate, no fast heart rate, no chest pain, no palpitations, no leg claudication, no lower extremity  Respiratory: No complaints of shortness of breath, no wheezing, no cough, no SOB on exertion, no orthopnea or PND  Gastrointestinal: No complaints of abdominal pain, no constipation, no nausea or vomiting, no diarrhea or bloody stools  Genitourinary: No complaints of dysuria, no incontinence, no hesitancy, no nocturia, no genital lesion, no testicular pain     Musculoskeletal: No complaints of arthralgia, no myalgias, no joint swelling or stiffness, no limb pain or swelling  Integumentary: No complaints of skin rash or skin lesions, no itching, no skin wound, no dry skin  Neurological: numbness (left flank), limb weakness (bilateral leg weakness ) and difficulty walking, but no headache, no tingling, no confusion, no dizziness, no convulsions and no fainting  Psychiatric: Is not suicidal, no sleep disturbances, no anxiety or depression, no change in personality, no emotional problems  Hematologic/Lymphatic: No complaints of swollen glands, no swollen glands in the neck, does not bleed easily, no easy bruising  ROS reviewed  Active Problems   1  Ataxia (781 3) (R27 0)  2  C7 cervical fracture (805 07) (S12 600A)  3  Cerebral cysts (348 0) (G93 0)  4  Cervical spinal stenosis (723 0) (M48 02)  5  Constipation (564 00) (K59 00)  6  Cyst of kidney, acquired (593 2) (N28 1)  7  Difficulty concentrating (799 51) (R41 840)  8  Disorientation (780 99) (R41 0)  9  Drop attack (780 2) (R55)  10  Excessive sweating (780 8) (R61)  11  Fatigue (780 79) (R53 83)  12  Follow-up examination following surgery (V67 00) (Z09)  13  Gait abnormality (781 2) (R26 9)  14  Gait disturbance (781 2) (R26 9)  15  Hearing loss (389 9) (H91 90)  16  Heartburn (787 1) (R12)  17  History of fall (V15 88) (Z91 81)  18  Hypertension (401 9) (I10)  19  Hyposmolality and/or hyponatremia (276 1) (E87 1)  20  L4 vertebral fracture (805 4) (S32 049A)  21  Left leg weakness (729 89) (R29 898)  22  Memory loss (780 93) (R41 3)  23  Muscle weakness (728 87) (M62 81)  24  Myalgia (729 1) (M79 1)  25  Other spondylosis with myelopathy, cervical region (721 1) (M47 12)  26  Painful urination (788 1) (R30 9)  27  Peripheral neuropathy (356 9) (G62 9)  28  Poor balance (781 99) (R26 89)  29  Spondylosis, unspecified (721 90) (M47 9)  30  Surgical counseling visit (V65 49) (Z71 89)  31  Vertigo (780 4) (R42)  32  Visual disturbances (368 9) (H53 9)  33   Vitamin B12 deficiency (266 2) (E53 8)  34  Weakness of left arm (729 89) (R29 898)    Past Medical History   1  History of seasonal allergies (V15 09) (Z88 9)  2  Personal history of cardiac murmur (V12 59) (Z86 79)  3  History of Thoracic outlet syndrome (353 0) (G54 0)  4  History of Wears eyeglasses (V49 89) (Z97 3)    The active problems and past medical history were reviewed and updated today  Surgical History   1  History of Facial Surgery  2  History of Hand Repair  3  History of Hernia Repair  4  History of Neck Surgery  5  History of Thoracotomy    The surgical history was reviewed and updated today  Family History  Mother   1  Family history of   2  Family history of cerebrovascular accident (V17 1) (Z82 3)  3  Family history of cerebrovascular accident (CVA) (V17 1) (Z82 3)  4  Family history of heart failure (V17 49) (Z82 49)  5  Family history of myocardial infarction (V17 3) (Z82 49)  Father   10  Family history of   7  Family history of Heart problem  Sibling   8  Family history of   5  Family history of myocardial infarction (V17 3) (Z82 49)  Maternal Grandmother   10  Family history of   Paternal Grandmother   6  Family history of   Maternal Grandfather   15  Family history of   Paternal Grandfather   15  Family history of     The family history was reviewed and updated today  Social History    · Alcohol Use (History)   · Caffeine Use   · Currently sexually active   · Daily alcohol use   · Education Level: Less than high school   · Functioning activity level   · Has 2 children   · Hearing loss (389 9) (H91 90)   · High school or GED   · Lives independently with spouse   · Lives with wife   ·    · Never A Smoker   · No drug use   · Two children  The social history was reviewed and updated today  Current Meds  1  Docusate Sodium 100 MG Oral Capsule; TAKE 1 CAPSULE TWICE DAILY; Therapy: 45Heh7528 to Recorded  2   Enoxaparin Sodium 40 MG/0 4ML Subcutaneous Solution; inject 0 4 mg under the skin   daily; Therapy: 01Aug2017 to Recorded  3  Gabapentin 100 MG Oral Capsule; 1 capsule 3 times daily; Therapy: 01Aug2017 to Recorded  4  Losartan Potassium 100 MG Oral Tablet; TAKE 1 TABLET DAILY; Therapy: 01Aug2017 to (Evaluate:28Jan2018) Recorded  5  Methocarbamol 500 MG Oral Tablet; 1 TABLET EVERY 8 HOURS PRN; Therapy: (Recorded:01Aug2017) to Recorded  6  Metoprolol Tartrate 50 MG Oral Tablet; TAKE 1 TABLET TWICE DAILY; Therapy: 01Aug2017 to (Evaluate:28Jan2018) Recorded  7  Omeprazole 40 MG Oral Capsule Delayed Release; 1 CAPSULE DAILY; Therapy: 01Aug2017 to Recorded  8  OxyCODONE HCl - 5 MG Oral Tablet; every 12 hours prn; Therapy: 01Aug2017 to Recorded  9  Polyethylene Glycol 3350 Oral Powder; USE AS DIRECTED; Therapy: 01Aug2017 to Recorded  10  Tylenol 325 MG Oral Tablet; 2 tablets every 8 hours; Therapy: 90QOF1577 to Recorded    The medication list was reviewed and updated today  Allergies   1  No Known Drug Allergies  Denied   2  Anesthesia Extension Tubing Miscellaneous    Vitals  Vital Signs    Recorded: 01Aug2017 09:19AM   Temperature 98 4 F, Tympanic   Heart Rate 64   Respiration 16   Systolic 026, LUE, Sitting   Diastolic 88, LUE, Sitting   Height 5 ft 11 in   Weight 214 lb    BMI Calculated 29 85   BSA Calculated 2 17     Physical Exam     Constitutional Patient appears healthy and well developed  No signs of acute distress present  Vista c-collar well fit  Wearing abdominal binder (which patient reports is for history of flank hematoma)  Respiratory Respiratory effort: Normal    Musculo: Spine   Spine:    Cervical Spine examination demonstrates no visible abnormailities and normal lordosis Cervical Spine: Tenderness: level inferior cervical spine  Skin Mature posterior cervical scar  Neurologic - Mental Status: Mood and affect: Affect is normal   Speech is articulated and fluent    Grossly nonfocal    Motor System - Upper Extremities: Shoulder abduction 5/5 bilaterally  Elbow flexion/extension 5/5 bilaterally  Finger  left 4+/5 and right 5/5 [has contracture in left palm extending to left 4th finger - chronic per patient)  Finger abduction/adduction 5/5 bilaterally  Finger extension left 5-/5 and right 5/5  Motor System - Lower Extremities: Hip flexion/abduction/adduction 5/5 bilaterally  Knee flexion 5/5 bilaterally  Knee extension with limited ROM b/l w/ 5/5 resistance  Ankle DF/PF 5/5 bilaterally  Great toe DF 5/5 bilaterally  Reflexes: Hyporeflexic b/l biceps, triceps, brachioradialis, patellar, and achilles  Toes unequivocal as patient briskly withdraws his feet on Babinski test [very ticklish on feet]  No ankle clonus bilaterally  Cuellar sign was not present:   Coordination: Involuntary movements: None   Sensory: Pinprick diminished medial left forearm compared to right arm  Pinprick intact of torso b/l  Patients reports pinprick diminished diffusely of lower extremities bilaterally  Able to sense vibration dorsum left foot but not in left great toe although sense touch of tuning fork to left great toe  Vibration intact right great toe  Vibration intact b/l thumbs  JPS intact b/l thumbs and great toes  Gait and Station: Ambulates steady with roller walker  Results/Data  Diagnostic Studies Reviewed [facility]:   I personally reviewed the cervical spine xray in detail with the patient  X-ray Review 7/28/17 ([facility]) -- Cervical spine x-ray -- per report:    "INDICATION: History of C7 fracture  Follow-up    COMPARISON: Plain radiographs July 17, 2017 and CT cervical spine July 16, 2017  VIEWS: AP and lateral    IMAGES: 3    FINDINGS:    There is slight progression of the distraction of the spinous process fracture of C7  The remaining osseous structures are intact  Postoperative changes with posterior fusion C3-C7  Hardware appears intact  Laminectomy defects of C4, C5 and C6      Disc space narrowing throughout the cervical spine  Degenerative changes at the atlantoaxial articulation  The prevertebral soft tissues are within normal limits  The lung apices are intact  Postoperative changes involving the left 1st and 2nd ribs  IMPRESSION:  1  Slight progressive distraction of the spinous process fracture involving C7   2  Degenerative and postoperative changes  Workstation performed: [id number]    Signed by:  Shelley Palencia DO  8/1/17    Report Author: Zaira Ritchie      Approved by: Zaira Ritchie      Approval Date: 08-    Approval Time: 08:31      THIS REPORT WAS RECEIVED FROM AN EXTERNAL RIS SYSTEM "  Future Appointments    Date/Time Provider Specialty Site   08/31/2017 08:30 AM Yasmeen Bah [facility] Neurosurgery [facility] NEUROSURGICAL [facility]   08/15/2017 02:00 PM Trauma, Schedule  [facility] SURGICAL [facility]     Signatures   Electronically signed by : ANGELIQUE Epstein;  Aug  2 2017  5:56AM EST                       (Author)    Electronically signed by : VIKTORIYA Silver ; Aug  2 2017  6:34AM EST

## 2018-01-10 NOTE — MISCELLANEOUS
Message  Pt's wife Teresa Sanchez reports the pt is currently residing at a [facility] for rehab  she questions if he will owe a co-pay for his f/u 8/1  This was reviewed with Neema Mark in front office, and wife notified that because he is at a SNF, he does not have to pay the co-pay  she stated an understanding        Signatures   Electronically signed by : Eduardo Jamison, ; Jul 25 2017  1:37PM EST                       (Author)

## 2018-01-12 VITALS
BODY MASS INDEX: 30.66 KG/M2 | WEIGHT: 219 LBS | DIASTOLIC BLOOD PRESSURE: 90 MMHG | SYSTOLIC BLOOD PRESSURE: 144 MMHG | HEART RATE: 64 BPM | TEMPERATURE: 97.8 F | RESPIRATION RATE: 16 BRPM | HEIGHT: 71 IN

## 2018-01-13 VITALS
BODY MASS INDEX: 30.57 KG/M2 | HEIGHT: 71 IN | TEMPERATURE: 99 F | SYSTOLIC BLOOD PRESSURE: 146 MMHG | WEIGHT: 218.38 LBS | HEART RATE: 64 BPM | DIASTOLIC BLOOD PRESSURE: 94 MMHG | RESPIRATION RATE: 18 BRPM

## 2018-01-13 VITALS
DIASTOLIC BLOOD PRESSURE: 80 MMHG | WEIGHT: 219.13 LBS | SYSTOLIC BLOOD PRESSURE: 134 MMHG | HEIGHT: 71 IN | BODY MASS INDEX: 30.68 KG/M2

## 2018-01-15 VITALS
WEIGHT: 214 LBS | SYSTOLIC BLOOD PRESSURE: 110 MMHG | HEART RATE: 64 BPM | RESPIRATION RATE: 16 BRPM | DIASTOLIC BLOOD PRESSURE: 88 MMHG | HEIGHT: 71 IN | BODY MASS INDEX: 29.96 KG/M2 | TEMPERATURE: 98.4 F

## 2018-03-08 ENCOUNTER — TELEPHONE (OUTPATIENT)
Dept: NEUROSURGERY | Facility: CLINIC | Age: 58
End: 2018-03-08

## 2018-06-22 ENCOUNTER — TELEPHONE (OUTPATIENT)
Dept: NEUROSURGERY | Facility: CLINIC | Age: 58
End: 2018-06-22

## 2020-12-09 PROBLEM — G93.0 CEREBRAL CYST: Status: ACTIVE | Noted: 2017-08-01

## 2021-04-13 ENCOUNTER — VBI (OUTPATIENT)
Dept: ADMINISTRATIVE | Facility: OTHER | Age: 61
End: 2021-04-13

## 2021-12-14 ENCOUNTER — APPOINTMENT (EMERGENCY)
Dept: RADIOLOGY | Facility: HOSPITAL | Age: 61
End: 2021-12-14
Payer: COMMERCIAL

## 2021-12-14 ENCOUNTER — APPOINTMENT (EMERGENCY)
Dept: CT IMAGING | Facility: HOSPITAL | Age: 61
End: 2021-12-14
Payer: COMMERCIAL

## 2021-12-14 ENCOUNTER — HOSPITAL ENCOUNTER (EMERGENCY)
Facility: HOSPITAL | Age: 61
Discharge: HOME | End: 2021-12-14
Attending: EMERGENCY MEDICINE
Payer: COMMERCIAL

## 2021-12-14 VITALS
HEART RATE: 61 BPM | WEIGHT: 230 LBS | TEMPERATURE: 97.4 F | RESPIRATION RATE: 18 BRPM | OXYGEN SATURATION: 98 % | SYSTOLIC BLOOD PRESSURE: 168 MMHG | BODY MASS INDEX: 33 KG/M2 | DIASTOLIC BLOOD PRESSURE: 95 MMHG

## 2021-12-14 DIAGNOSIS — I16.0 HYPERTENSIVE URGENCY: Primary | ICD-10-CM

## 2021-12-14 LAB
2HR DELTA HS TROPONIN: 0 NG/L
ALBUMIN SERPL BCP-MCNC: 3.9 G/DL (ref 3–5.2)
ALP SERPL-CCNC: 81 U/L (ref 43–122)
ALT SERPL W P-5'-P-CCNC: 29 U/L
ANION GAP SERPL CALCULATED.3IONS-SCNC: 3 MMOL/L (ref 5–14)
AST SERPL W P-5'-P-CCNC: 38 U/L (ref 17–59)
ATRIAL RATE: 71 BPM
BASOPHILS # BLD AUTO: 0.1 THOUSANDS/ΜL (ref 0–0.1)
BASOPHILS NFR BLD AUTO: 1 % (ref 0–1)
BILIRUB SERPL-MCNC: 1.07 MG/DL
BUN SERPL-MCNC: 13 MG/DL (ref 5–25)
CALCIUM SERPL-MCNC: 8.9 MG/DL (ref 8.4–10.2)
CARDIAC TROPONIN I PNL SERPL HS: 6 NG/L
CARDIAC TROPONIN I PNL SERPL HS: 6 NG/L
CHLORIDE SERPL-SCNC: 100 MMOL/L (ref 97–108)
CO2 SERPL-SCNC: 28 MMOL/L (ref 22–30)
CREAT SERPL-MCNC: 0.75 MG/DL (ref 0.7–1.5)
EOSINOPHIL # BLD AUTO: 0 THOUSAND/ΜL (ref 0–0.4)
EOSINOPHIL NFR BLD AUTO: 1 % (ref 0–6)
ERYTHROCYTE [DISTWIDTH] IN BLOOD BY AUTOMATED COUNT: 13 %
FLUAV RNA RESP QL NAA+PROBE: NEGATIVE
FLUBV RNA RESP QL NAA+PROBE: NEGATIVE
GFR SERPL CREATININE-BSD FRML MDRD: 97 ML/MIN/1.73SQ M
GLUCOSE SERPL-MCNC: 106 MG/DL (ref 70–99)
HCT VFR BLD AUTO: 48.8 % (ref 41–53)
HGB BLD-MCNC: 17 G/DL (ref 13.5–17.5)
LYMPHOCYTES # BLD AUTO: 0.7 THOUSANDS/ΜL (ref 0.5–4)
LYMPHOCYTES NFR BLD AUTO: 13 % (ref 25–45)
MCH RBC QN AUTO: 34.2 PG (ref 26–34)
MCHC RBC AUTO-ENTMCNC: 34.7 G/DL (ref 31–36)
MCV RBC AUTO: 98 FL (ref 80–100)
MONOCYTES # BLD AUTO: 0.6 THOUSAND/ΜL (ref 0.2–0.9)
MONOCYTES NFR BLD AUTO: 10 % (ref 1–10)
NEUTROPHILS # BLD AUTO: 4.4 THOUSANDS/ΜL (ref 1.8–7.8)
NEUTS SEG NFR BLD AUTO: 76 % (ref 45–65)
P AXIS: 23 DEGREES
PLATELET # BLD AUTO: 159 THOUSANDS/UL (ref 150–450)
PMV BLD AUTO: 6.6 FL (ref 8.9–12.7)
POTASSIUM SERPL-SCNC: 4.3 MMOL/L (ref 3.6–5)
PR INTERVAL: 168 MS
PROT SERPL-MCNC: 7.5 G/DL (ref 5.9–8.4)
QRS AXIS: -11 DEGREES
QRSD INTERVAL: 80 MS
QT INTERVAL: 396 MS
QTC INTERVAL: 430 MS
RBC # BLD AUTO: 4.96 MILLION/UL (ref 4.5–5.9)
RSV RNA RESP QL NAA+PROBE: NEGATIVE
SARS-COV-2 RNA RESP QL NAA+PROBE: NEGATIVE
SODIUM SERPL-SCNC: 131 MMOL/L (ref 137–147)
T WAVE AXIS: 23 DEGREES
VENTRICULAR RATE: 71 BPM
WBC # BLD AUTO: 5.8 THOUSAND/UL (ref 4.5–11)

## 2021-12-14 PROCEDURE — 85025 COMPLETE CBC W/AUTO DIFF WBC: CPT

## 2021-12-14 PROCEDURE — 80053 COMPREHEN METABOLIC PANEL: CPT

## 2021-12-14 PROCEDURE — 99285 EMERGENCY DEPT VISIT HI MDM: CPT

## 2021-12-14 PROCEDURE — 93010 ELECTROCARDIOGRAM REPORT: CPT

## 2021-12-14 PROCEDURE — 70450 CT HEAD/BRAIN W/O DYE: CPT

## 2021-12-14 PROCEDURE — 93005 ELECTROCARDIOGRAM TRACING: CPT

## 2021-12-14 PROCEDURE — 36415 COLL VENOUS BLD VENIPUNCTURE: CPT

## 2021-12-14 PROCEDURE — 71045 X-RAY EXAM CHEST 1 VIEW: CPT

## 2021-12-14 PROCEDURE — 84484 ASSAY OF TROPONIN QUANT: CPT

## 2021-12-14 PROCEDURE — 0241U HB NFCT DS VIR RESP RNA 4 TRGT: CPT

## 2021-12-14 RX ORDER — METOPROLOL TARTRATE 50 MG/1
50 TABLET, FILM COATED ORAL ONCE
Status: COMPLETED | OUTPATIENT
Start: 2021-12-14 | End: 2021-12-14

## 2021-12-14 RX ORDER — CLONIDINE HYDROCHLORIDE 0.1 MG/1
0.2 TABLET ORAL ONCE
Status: COMPLETED | OUTPATIENT
Start: 2021-12-14 | End: 2021-12-14

## 2021-12-14 RX ADMIN — CLONIDINE HYDROCHLORIDE 0.2 MG: 0.1 TABLET ORAL at 18:12

## 2021-12-14 RX ADMIN — METOPROLOL TARTRATE 50 MG: 50 TABLET, FILM COATED ORAL at 18:12

## 2023-01-04 ENCOUNTER — TELEPHONE (OUTPATIENT)
Age: 63
End: 2023-01-04

## 2023-01-04 NOTE — TELEPHONE ENCOUNTER
Patient's wife Esdras Quiroz calling to schedule colonoscopy  States patient had bloodwork has blood in colon  Verified information  Patient confined to wheelchair but can use walker to help ambulate  Please call to schedule

## 2023-01-10 ENCOUNTER — PREP FOR PROCEDURE (OUTPATIENT)
Age: 63
End: 2023-01-10

## 2023-01-10 ENCOUNTER — NURSE TRIAGE (OUTPATIENT)
Age: 63
End: 2023-01-10

## 2023-01-10 DIAGNOSIS — Z12.11 SCREENING FOR COLON CANCER: Primary | ICD-10-CM

## 2023-01-10 NOTE — TELEPHONE ENCOUNTER
* Duplicate note due to system upgraded *    Patient's wife Sunnie Hammans calling to schedule colonoscopy  States patient had bloodwork has blood in colon  Verified information  Patient confined to wheelchair but can use walker to help ambulate  Please call to schedule  First colon, BMI 30, HTN, in wheelchair due to injury can use walker    Scheduled 3/13/2023 at [facility] w/DB    Paperwork mailed to pt       Additional Information  • Negative: Is this a new patient under the age of 48? • Negative: Is this a patient over the age of 76 that has never had a colonoscopy? • Negative: Is this patient over the age of [de-identified] with a history of cancer and/or polyps? • Negative: Has the patient had a colonoscopy within the last year and when was it? • Negative: Does the patient have a family history of colon or rectal cancer? • Negative: Does the patient experience chest pain or shortness of breath when climbing stairs? • Negative: Does the patient require oxygen support? • Negative: Has the patient had a cardiac procedure within the last year? • Negative: Has the patient had coronary artery disease (CAD) with stents or myocardial infarction in the last three months? • Negative: Does the patient have ongoing cardiac ischemia, severe valve dysfunction, or severe reduction of ejection fraction? • Negative: Has the patient had a stroke or blood clots? • Negative: Has the patient had a transient ischemic attack (TIA) or cerebrovascular accident (CVA) in the last three months? • Negative: Is the patient currently on any blood thinners such as Coumadin, Plavix, Eliquis, Pradaxa, Xarelto, etc?  (Check the patient's current medication list and document reason for taking medication)  • Negative: Has the patient had a knee or hip replacement within the last 6 months that required antibiotic coverage prior to the procedure? • Negative: Advised Patient - Initial Assessment  1   Patient advised that our office requires 72 hours notice for a cancellation of a procedure to avoid incurring a missed appointment fee  2  Asked patient to please contact insurance company to ask how they will be processing the individual claim for the procedure  3  Advised patient that he is welcome to see the doctor in the office prior to the procedure  4  Advised patient to be at the location of the procedure at 30 minutes prior to the scheduled time      Protocols used: TONI AMB CRC COLONOSCOPY SCHEDULING

## 2023-01-10 NOTE — LETTER
[phone] [id number]        ------------------------------------------------------------------------------------------------------------  FLEXIBLE COLONOSCOPY INSTRUCTIONS  PLEASE NOTE    AS OF JUNE 1, 2014, OUR OFFICE REQUIRES 72 HOURS NOTICE OF CANCELLATION/RESCHEDULE OF A PROCEDURE TO AVOID INCURRING A MISSED APPOINTMENT FEE  Your Colonoscopy Procedure has been scheduled at:[facility]  [address] , [place], [facility]     The Date of your Procedure is: 3/13/2023  The hospital facility will contact you the evening prior to your scheduled procedure with your arrival time  Use the bowel preparation as directed  Check with your family doctor if you are taking a blood thinner (Coumadin, Plavix, Xarelto, Pradaxa, Gingko biloba, Ginseng, Feverfew, St  Kenneth's Wort)  We suggest stopping these for 3 days  Special instructions may be needed if you are taking aspirin or any aspirin-containing medication  Check with your family physician  If you are on DIABETIC MEDICATION (tablets or insulin) your doctor may make changes in your preparation  Take all medications usual unless otherwise instructed  As always, if you have any questions or concerns, feel free to call the office  Our  staff will be happy to connect you with one of the nurses to answer any questions or address any concerns you have regarding your procedure  Do not wear any jewelry the day of your procedure including wedding rings  Arrangements must be made for a responsible party to drive you home from the procedure  If you do not have a responsible family member or friend to drive you home, the procedure will not be done  Vast or a taxi is not acceptable  It is important you notify our office of any insurance changes prior to your procedure and, if necessary, supply us with referrals from your primary care physician                COLONOSCOPY PREPARATION INSTRUCTIONS    Purchase (prescription not required):  · 238 gram bottle of Miralax® (Glycolax®)  · 4 Dulcolax® (Bisacodyl) Laxative Tablets  · 64 oz  bottle of Gatorade® or your preference of a non-carbonated clear liquid - NOT RED OR PURPLE     One Day Prior to Colonoscopy Procedure  · Nothing to eat the day before your procedure, only clear liquids  · It is important that you drink plenty of clear liquids throughout the day to prevent dehydration  Clear Liquids include:  o Water/Iced Tea/Lemonade/Gatorade®/Black Coffee or tea (no milk or creamers  o Soft drinks: orange, ginger ale, cola, Pepsi®, Sprite®, 7Up®  o Aneesh-Aid® (lemonade or orange flavors only)  o Strained fruit juices without pulp such as apple, white grape, white cranberry  o Jell-O®, lemon, lime or orange (no fruit or toppings)  o Popsicles, [place] Ice (No Ice Cream, sherbets, or fruit bars)  o Chicken or beef bouillon/broth  DO NOT EAT OR DRINK ANYTHING RED OR PURPLE  DO NOT DRINK ANY ALCOHOLIC BEVERAGES  DIABETIC PATIENTS: Consult your physician    At 4:00 pm, take (2) Dulcolax® (Bisacodyl) Laxative Tablets  Swallow the tablets whole with an 8 oz  glass of water  At 8:00 pm, take the additional (2) Dulcolax® (Bisacodyl) Laxative Tablets with 8 oz  of water  The package may direct you not to exceed (2) tablets at any time but for the purpose of this examination, you should take (4) total     Mix the 238 gm of Miralax® in 64 oz  of Gatorade® and shake the solution until the Miralax® is dissolved  You will drink half (32 oz) of this solution this evening, beginning between 4 and 6 o'clock  Drink 8 oz glassfuls at your own pace  It may take several hours to drink the solution  Remember to stay close to toilet facilities  DAY OF COLONOSCOPY PROCEDURE    Five (5) hours before your procedure, drink the other half (32 oz) of the Miralax®/Gatorade® mixture within a two (2) hour period   This may require you to get up very early if you are scheduled for an early procedure  NOTHING IS TO BE TAKEN BY MOUTH 3 HOURS PRIOR TO PROCEDURE  If you use an inhaler, please bring it with you to your procedure

## 2023-03-13 ENCOUNTER — HOSPITAL ENCOUNTER (OUTPATIENT)
Dept: GASTROENTEROLOGY | Facility: HOSPITAL | Age: 63
Setting detail: OUTPATIENT SURGERY
Discharge: HOME | End: 2023-03-13
Attending: COLON & RECTAL SURGERY

## 2023-03-13 ENCOUNTER — ANESTHESIA (OUTPATIENT)
Dept: GASTROENTEROLOGY | Facility: HOSPITAL | Age: 63
End: 2023-03-13

## 2023-03-13 ENCOUNTER — ANESTHESIA EVENT (OUTPATIENT)
Dept: GASTROENTEROLOGY | Facility: HOSPITAL | Age: 63
End: 2023-03-13

## 2023-03-13 VITALS
HEIGHT: 71 IN | TEMPERATURE: 97.7 F | RESPIRATION RATE: 16 BRPM | HEART RATE: 66 BPM | BODY MASS INDEX: 30.66 KG/M2 | WEIGHT: 219 LBS | OXYGEN SATURATION: 95 % | DIASTOLIC BLOOD PRESSURE: 88 MMHG | SYSTOLIC BLOOD PRESSURE: 140 MMHG

## 2023-03-13 DIAGNOSIS — Z12.11 SCREENING FOR COLON CANCER: ICD-10-CM

## 2023-03-13 RX ORDER — PROPOFOL 10 MG/ML
INJECTION, EMULSION INTRAVENOUS CONTINUOUS PRN
Status: DISCONTINUED | OUTPATIENT
Start: 2023-03-13 | End: 2023-03-13

## 2023-03-13 RX ORDER — PROPOFOL 10 MG/ML
INJECTION, EMULSION INTRAVENOUS AS NEEDED
Status: DISCONTINUED | OUTPATIENT
Start: 2023-03-13 | End: 2023-03-13

## 2023-03-13 RX ORDER — LIDOCAINE HYDROCHLORIDE 10 MG/ML
INJECTION, SOLUTION EPIDURAL; INFILTRATION; INTRACAUDAL; PERINEURAL AS NEEDED
Status: DISCONTINUED | OUTPATIENT
Start: 2023-03-13 | End: 2023-03-13

## 2023-03-13 RX ADMIN — PROPOFOL 100 MG: 10 INJECTION, EMULSION INTRAVENOUS at 10:31

## 2023-03-13 RX ADMIN — LIDOCAINE HYDROCHLORIDE 30 MG: 10 INJECTION, SOLUTION EPIDURAL; INFILTRATION; INTRACAUDAL; PERINEURAL at 10:31

## 2023-03-13 RX ADMIN — PROPOFOL 120 MCG/KG/MIN: 10 INJECTION, EMULSION INTRAVENOUS at 10:31

## 2023-03-13 NOTE — ANESTHESIA POSTPROCEDURE EVALUATION
Post-Op Assessment Note    CV Status:  Stable    Pain management: adequate  Multimodal analgesia used between 6 hours prior to anesthesia start to PACU discharge    Mental Status:  Alert and awake   Hydration Status:  Euvolemic and stable   PONV Controlled:  Controlled   Airway Patency:  Patent   Two or more mitigation strategies used for obstructive sleep apnea   Post Op Vitals Reviewed: Yes      Staff: CRNA         No notable events documented      /73 (03/13/23 1057)    Temp 97 7 °F (36 5 °C) (03/13/23 1057)    Pulse 74 (03/13/23 1057)   Resp 18 (03/13/23 1057)    SpO2 94 % (03/13/23 1057)

## 2023-03-13 NOTE — ANESTHESIA PREPROCEDURE EVALUATION
Procedure:  COLONOSCOPY    Relevant Problems   CARDIO   (+) Hypertension      NEURO/PSYCH   (+) Depression        Physical Exam    Airway    Mallampati score: II  TM Distance: >3 FB  Neck ROM: full     Dental   Comment: No Loose teeth per patient  Many missing ,     Cardiovascular      Pulmonary      Other Findings        Anesthesia Plan  ASA Score- 2     Anesthesia Type- IV sedation with anesthesia with ASA Monitors  Additional Monitors:   Airway Plan:           Plan Factors-    Chart reviewed  Patient summary reviewed  Patient is not a current smoker  Obstructive sleep apnea risk education given perioperatively  Induction- intravenous  Postoperative Plan-     Informed Consent- Anesthetic plan and risks discussed with patient and spouse  I personally reviewed this patient with the CRNA  Discussed and agreed on the Anesthesia Plan with the CRNA  Quincy Caldwell

## 2023-03-13 NOTE — H&P
History and Physical   Colon and Rectal Surgery   Britt Quinonez 57 y o  male MRN: [id number]  Unit/Bed#:  Encounter: [id number]  03/13/23   @NOW    No chief complaint on file          History of Present Illness   HPI:  Britt Quinonez is a 57 y o  male who presents for valuation microscopic Hemoccult positive testing      Historical Information   Past Medical History:   Diagnosis Date   • Falls frequently    • Heart disease    • Hypertension    • Neck pain      Past Surgical History:   Procedure Laterality Date   • NECK SURGERY      Pt has "15 rods and pins in the neck"       Meds/Allergies     (Not in a hospital admission)        Current Outpatient Medications:   •  amLODIPine (NORVASC) 10 mg tablet, TAKE 1 TABLET BY MOUTH EVERY DAY, Disp: 90 tablet, Rfl: 3  •  escitalopram (Lexapro) 10 mg tablet, Take 1 tablet (10 mg total) by mouth in the morning , Disp: 90 tablet, Rfl: 3  •  levothyroxine (Synthroid) 88 mcg tablet, Take 1 tablet (88 mcg total) by mouth daily, Disp: 90 tablet, Rfl: 3  •  losartan (COZAAR) 100 MG tablet, TAKE 1 TABLET BY MOUTH EVERY DAY, Disp: 90 tablet, Rfl: 3  •  losartan (COZAAR) 100 MG tablet, TAKE 1 TABLET BY MOUTH EVERY DAY, Disp: 90 tablet, Rfl: 3  •  methocarbamol (ROBAXIN) 500 mg tablet, TAKE 1 TABLET BY MOUTH 4 TIMES A DAY AS NEEDED FOR MUSCLE SPASMS , Disp: 120 tablet, Rfl: 2  •  metoprolol tartrate (LOPRESSOR) 50 mg tablet, TAKE 1 TABLET BY MOUTH TWICE A DAY WITH MEALS, Disp: 180 tablet, Rfl: 1  •  omeprazole (PriLOSEC) 20 mg delayed release capsule, TAKE 1 CAPSULE BY MOUTH EVERY DAY, Disp: 90 capsule, Rfl: 2  •  acetaminophen (TYLENOL) 325 mg tablet, Take 2 tablets by mouth every 6 (six) hours as needed for mild pain (Patient not taking: Reported on 12/14/2021 ), Disp: 30 tablet, Rfl: 0  •  Blood Pressure Monitor KIT, Use daily (Patient not taking: Reported on 12/14/2021 ), Disp: 1 each, Rfl: 0  •  docusate sodium (COLACE) 100 mg capsule, Take 1 capsule by mouth 2 (two) times a day (Patient not taking: Reported on 12/14/2021 ), Disp: 10 capsule, Rfl: 0  •  enoxaparin (LOVENOX) 40 mg/0 4 mL, Inject 0 4 mL under the skin every 24 hours (Patient not taking: Reported on 12/14/2021 ), Disp: 28 Syringe, Rfl: 0  •  gabapentin (NEURONTIN) 100 mg capsule, Take 1 capsule by mouth 3 (three) times a day (Patient not taking: Reported on 12/14/2021 ), Disp: 30 capsule, Rfl: 0  •  OMEPRAZOLE PO, Take 1 tablet by mouth daily, Disp: , Rfl:   •  polyethylene glycol (MIRALAX) 17 g packet, Take 17 g by mouth daily as needed (constipation) (Patient not taking: Reported on 12/14/2021 ), Disp: 14 each, Rfl: 0  •  sertraline (ZOLOFT) 25 mg tablet, Take 1 tablet by mouth daily at bedtime (Patient not taking: Reported on 12/14/2021 ), Disp: 30 tablet, Rfl: 0    No Known Allergies      Social History   Social History     Substance and Sexual Activity   Alcohol Use Yes    Comment: 4-6 beers daily     Social History     Substance and Sexual Activity   Drug Use No     Social History     Tobacco Use   Smoking Status Never   Smokeless Tobacco Never         Family History: No family history on file  Objective     Current Vitals:   Blood Pressure: 140/73 (03/13/23 1023)  Pulse: 73 (03/13/23 1023)  Temperature: 99 1 °F (37 3 °C) (03/13/23 1023)  Respirations: 20 (03/13/23 1023)  Height: 5' 11" (180 3 cm) (03/13/23 1023)  Weight - Scale: 99 3 kg (219 lb) (03/13/23 1023)  SpO2: 96 % (03/13/23 1023)  No intake or output data in the 24 hours ending 03/13/23 1033    Physical Exam:  General:  Resting comfortably in bed   Eyes:Sclera anicteric  ENT: Trachea midline  Pulm:  Symmetric chest raise  No respiratory Distress  CV:  Regular on monitor  Abdomen:  Soft NT ND  Extremities:  No clubbing/ cyanosis/ edema    Lab Results: I have personally reviewed pertinent lab results  Imaging: I have personally reviewed pertinent reports          ASSESSMENT:  Louisa Ramirez is a 57 y o  male who presents for outpatient colonoscopy  PLAN:  For colonoscopy    Risks/ Benefits reviewed to include but not limited to anesthesia, bleeding, missed lesions, and colonoscopic perforation requiring surgery

## 2024-05-28 ENCOUNTER — TELEPHONE (OUTPATIENT)
Age: 64
End: 2024-05-28

## 2024-05-28 NOTE — TELEPHONE ENCOUNTER
Wife calling to schedule surgery for , notes PCP biopsied growth on neck, states it came as BCC. Routing for review.

## 2024-05-28 NOTE — TELEPHONE ENCOUNTER
LM on  for return call. More information needed regarding size and color photo of lesion before we can schedule.

## 2024-05-31 NOTE — TELEPHONE ENCOUNTER
Spoke with patient wife advised her that they need to get us a color photo and the pathology report from the PCP,  Dianelys said they can have the PCP email the photo to her at [email or web address], and have the PCP fax the pathology report and records to us at [phone]. Patients wife understood and will call the PCP on Monday

## 2024-05-31 NOTE — TELEPHONE ENCOUNTER
Patient's wife called stating that she's been waiting for a call back. Advised of call back on 5/28    Triaged call for requested info needed

## 2024-06-03 NOTE — TELEPHONE ENCOUNTER
Patient's PCP will have to provide a color photo. Patient will have to return to their office for a photo. We have no idea where this lesion is on the neck or how big it is. Can you please advise patient/PCP? Thank you

## 2024-06-03 NOTE — TELEPHONE ENCOUNTER
Received a call from [facility] asking what needs to be sent.  I informed her we need the pathology report and a colored photo.  She said she can send path report but they didn't take photos. Please advise

## 2024-06-05 NOTE — TELEPHONE ENCOUNTER
Spoke w/ patients wife and informed her we received a copy of the path report and that we just need a color photo and she stated she is going to email photo

## 2024-06-05 NOTE — TELEPHONE ENCOUNTER
Photo received not specific to location on the body or clear. Sherine stated she will send another photo tomorrow. Also stated she will contact PCP for a size.